# Patient Record
Sex: MALE | Race: WHITE
[De-identification: names, ages, dates, MRNs, and addresses within clinical notes are randomized per-mention and may not be internally consistent; named-entity substitution may affect disease eponyms.]

---

## 2018-11-01 ENCOUNTER — HOSPITAL ENCOUNTER (OUTPATIENT)
Dept: HOSPITAL 92 - TBSIIMAG | Age: 63
Discharge: HOME | End: 2018-11-01
Attending: NEUROLOGICAL SURGERY
Payer: COMMERCIAL

## 2018-11-01 DIAGNOSIS — M47.814: ICD-10-CM

## 2018-11-01 DIAGNOSIS — M89.9: ICD-10-CM

## 2018-11-01 DIAGNOSIS — J43.2: ICD-10-CM

## 2018-11-01 DIAGNOSIS — Z98.890: ICD-10-CM

## 2018-11-01 DIAGNOSIS — M54.6: ICD-10-CM

## 2018-11-01 DIAGNOSIS — M47.816: ICD-10-CM

## 2018-11-01 DIAGNOSIS — M54.5: Primary | ICD-10-CM

## 2018-11-01 DIAGNOSIS — M85.88: ICD-10-CM

## 2018-11-01 PROCEDURE — 72128 CT CHEST SPINE W/O DYE: CPT

## 2018-11-01 PROCEDURE — 72131 CT LUMBAR SPINE W/O DYE: CPT

## 2018-11-01 NOTE — CT
CT OF THE THORACIC SPINE WITHOUT CONTRAST:

11/1/18

 

HISTORY: 

Fall with back pain, predominantly on the right side. 

 

COMPARISON:  

None.

 

FINDINGS:  

There is moderate multilevel disc degenerative disease.  There is a 1.3 cm well circumscribed mixed s
clerotic and lucent lesion seen within the posterior and superior aspect of the T7 vertebral body but
 demonstrates some vertically oriented trabeculation consistent with changes of a small bony hemangio
ma. There is diffuse osteopenia. No appreciable osseous central canal or neural foraminal narrowing i
s evident. No acute fracture or subluxation is demonstrated. There is scattered emphysema in the visu
alized lungs. There is scattered vascular calcifications seen involving the aorta. 

 

IMPRESSION:  

1.      No acute fracture or subluxation demonstrated. 

2.      Diffuse osteopenia.

3.      Moderate multilevel spondylosis of the thoracic spine. 

4.      Well circumscribed sclerotic lesion at T7 with accentuated vertically oriented trabeculation 
most suspicious for a bony hemangioma. 

5.      Scattered centrilobular emphysema.  

 

POS: [facility]

## 2018-11-01 NOTE — CT
CT OF THE LUMBAR SPINE 

11/1/18

 

COMPARISON:  

9/14/16

 

HISTORY: 

Fall, trauma, pain on the right. 

 

TECHNIQUE:  

Serial axial CT imaging at 3 mm intervals through the lumbar spine without contrast. Coronal and sagi
ttal reformatted imaging obtained. 

 

FINDINGS:  

Evaluation for central canal and/or neural foraminal stenosis is limited on routine CT. Posterior ped
icle screws are present bilaterally at L4, L5, L6 and S1 as there are six lumbar type vertebral angela
s present. Mild superior end plate irregularity and anterior wedging noted at the L4 vertebral body, 
unchanged when compared to the prior exam. There is no anterolisthesis or retrolisthesis noted. There
 is a scoliotic curvature of the lumbar spine with AP to the right centered at the L2-3 level. 

 

T12-L1: No osseous cause of significant central canal and neural foraminal stenosis. 

 

L1-2: Bilateral facet hypertrophy, left greater than right. No osseous cause of significant central c
anal and neural foraminal stenosis. 

 

L2-3: Prominent disc space narrowing and degenerative end plate change, especially laterally on the l
eft with lateral osteophyte formation. There is osteophyte extension into the left neural foramen as 
well. 

 

L3-4: Disc space narrowing and mild disc bulge. Bilateral facet hypertrophy. Mild central canal steno
sis on the left and moderate neural foraminal stenosis on the right. 

 

L4-5: There is prominent facet hypertrophy on the right with osteophyte encroachment on the right adrian
ral foramen. No osseous cause of significant central canal or left neural foraminal stenosis.

 

L5-6: Bilateral facet hypertrophy. Probable mild right neural foraminal stenosis. No osseous cause of
 significant central canal or left neural foraminal stenosis. 

 

L6-S1: Bilateral facet hypertrophy. No osseous cause of significant central canal and neural foramina
l stenosis. 

 

No acute fracture or dislocation. No worrisome lytic blastic bone lesion. 

 

There is multifocal atherosclerotic calcification of the abdominal aorta and its branches. The partia
lly visualized stones are seen within the gallbladder lumen. Multiple punctate nonobstructing stones 
are noted within both kidneys.

 

IMPRESSION:  

Multilevel postoperative and degenerative change noted within the lumbar spine as detailed above. No 
displaced fracture noted. Additional incidental findings as detailed above. 

 

POS: OFF

## 2021-11-23 ENCOUNTER — HOSPITAL ENCOUNTER (OUTPATIENT)
Dept: HOSPITAL 92 - CSHULT | Age: 66
Discharge: HOME | End: 2021-11-23
Attending: INTERNAL MEDICINE
Payer: MEDICARE

## 2021-11-23 DIAGNOSIS — K80.20: ICD-10-CM

## 2021-11-23 DIAGNOSIS — Z13.6: Primary | ICD-10-CM

## 2021-11-23 PROCEDURE — 76706 US ABDL AORTA SCREEN AAA: CPT

## 2023-04-21 ENCOUNTER — HOSPITAL ENCOUNTER (OUTPATIENT)
Dept: HOSPITAL 92 - LABBT | Age: 68
Discharge: HOME | End: 2023-04-21
Attending: OTOLARYNGOLOGY
Payer: COMMERCIAL

## 2023-04-21 DIAGNOSIS — Z01.818: Primary | ICD-10-CM

## 2023-04-21 DIAGNOSIS — S09.22XA: ICD-10-CM

## 2023-04-21 LAB
ANION GAP SERPL CALC-SCNC: 18 MMOL/L (ref 10–20)
BUN SERPL-MCNC: 12 MG/DL (ref 8.4–25.7)
CALCIUM SERPL-MCNC: 9.1 MG/DL (ref 7.8–10.44)
CHLORIDE SERPL-SCNC: 105 MMOL/L (ref 98–107)
CO2 SERPL-SCNC: 22 MMOL/L (ref 23–31)
CREAT CL PREDICTED SERPL C-G-VRATE: 0 ML/MIN (ref 70–130)
GLUCOSE SERPL-MCNC: 84 MG/DL (ref 80–115)
HGB BLD-MCNC: 13.2 G/DL (ref 13.5–17.5)
POTASSIUM SERPL-SCNC: 4.4 MMOL/L (ref 3.5–5.1)
SODIUM SERPL-SCNC: 141 MMOL/L (ref 136–145)

## 2023-04-21 PROCEDURE — 80048 BASIC METABOLIC PNL TOTAL CA: CPT

## 2023-04-21 PROCEDURE — 93005 ELECTROCARDIOGRAM TRACING: CPT

## 2023-04-21 PROCEDURE — 85018 HEMOGLOBIN: CPT

## 2023-04-21 PROCEDURE — 85014 HEMATOCRIT: CPT

## 2023-04-21 PROCEDURE — 93010 ELECTROCARDIOGRAM REPORT: CPT

## 2023-04-27 ENCOUNTER — HOSPITAL ENCOUNTER (OUTPATIENT)
Dept: HOSPITAL 92 - SDC | Age: 68
Discharge: HOME | End: 2023-04-27
Attending: OTOLARYNGOLOGY
Payer: COMMERCIAL

## 2023-04-27 VITALS — BODY MASS INDEX: 22.4 KG/M2

## 2023-04-27 DIAGNOSIS — S09.21XA: Primary | ICD-10-CM

## 2023-04-27 DIAGNOSIS — Z79.899: ICD-10-CM

## 2023-04-27 DIAGNOSIS — I10: ICD-10-CM

## 2023-04-27 DIAGNOSIS — F17.210: ICD-10-CM

## 2023-04-27 DIAGNOSIS — Z86.14: ICD-10-CM
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
## 2023-04-27 PROCEDURE — 09U707Z SUPPLEMENT RIGHT TYMPANIC MEMBRANE WITH AUTOLOGOUS TISSUE SUBSTITUTE, OPEN APPROACH: ICD-10-PCS | Performed by: OTOLARYNGOLOGY
